# Patient Record
Sex: MALE | Race: WHITE | NOT HISPANIC OR LATINO | Employment: FULL TIME | URBAN - METROPOLITAN AREA
[De-identification: names, ages, dates, MRNs, and addresses within clinical notes are randomized per-mention and may not be internally consistent; named-entity substitution may affect disease eponyms.]

---

## 2023-05-30 ENCOUNTER — TELEPHONE (OUTPATIENT)
Dept: UROLOGY | Facility: CLINIC | Age: 23
End: 2023-05-30
Payer: COMMERCIAL

## 2023-05-30 NOTE — TELEPHONE ENCOUNTER
Health Call Center    Phone Message    May a detailed message be left on voicemail: yes     Reason for Call: The patient called to schedule Orchiectomy consult with Dr. Valdez specifically. Please contact patient. Thank you.    Action Taken: Message routed to:  [facility] ([facility] COMPREHENSIVE GENDER CARE [facility]    Travel Screening: Not Applicable

## 2023-06-01 NOTE — CONFIDENTIAL NOTE
Writer called to discuss pt request for orchiectomy consult. Writer discussed LOS requirements. Pt stated she has a therapist and psychiatrist who could write the letters. Pt to call back and schedule once her providers have written letters.

## 2023-06-08 ENCOUNTER — HOSPITAL ENCOUNTER (EMERGENCY)
Facility: CLINIC | Age: 23
Discharge: HOME | End: 2023-06-08
Attending: EMERGENCY MEDICINE | Admitting: EMERGENCY MEDICINE
Payer: COMMERCIAL

## 2023-06-08 VITALS
SYSTOLIC BLOOD PRESSURE: 117 MMHG | RESPIRATION RATE: 16 BRPM | BODY MASS INDEX: 30.1 KG/M2 | OXYGEN SATURATION: 100 % | DIASTOLIC BLOOD PRESSURE: 68 MMHG | TEMPERATURE: 98.2 F | HEART RATE: 62 BPM | WEIGHT: 215 LBS | HEIGHT: 71 IN

## 2023-06-08 DIAGNOSIS — Z78.9 TRANSGENDER: ICD-10-CM

## 2023-06-08 DIAGNOSIS — F33.1 MODERATE EPISODE OF RECURRENT MAJOR DEPRESSIVE DISORDER (H): ICD-10-CM

## 2023-06-08 DIAGNOSIS — F60.3 BORDERLINE PERSONALITY DISORDER (H): ICD-10-CM

## 2023-06-08 DIAGNOSIS — B37.0 THRUSH: ICD-10-CM

## 2023-06-08 DIAGNOSIS — Z86.59 HISTORY OF ANOREXIA NERVOSA: ICD-10-CM

## 2023-06-08 LAB
AMPHETAMINES UR QL SCN: NORMAL
BARBITURATES UR QL SCN: NORMAL
BENZODIAZ UR QL SCN: NORMAL
BZE UR QL SCN: NORMAL
CANNABINOIDS UR QL SCN: NORMAL
OPIATES UR QL SCN: NORMAL

## 2023-06-08 PROCEDURE — 90471 IMMUNIZATION ADMIN: CPT | Performed by: EMERGENCY MEDICINE

## 2023-06-08 PROCEDURE — 80307 DRUG TEST PRSMV CHEM ANLYZR: CPT | Performed by: EMERGENCY MEDICINE

## 2023-06-08 PROCEDURE — 99285 EMERGENCY DEPT VISIT HI MDM: CPT | Mod: 25 | Performed by: EMERGENCY MEDICINE

## 2023-06-08 PROCEDURE — 250N000011 HC RX IP 250 OP 636: Performed by: EMERGENCY MEDICINE

## 2023-06-08 PROCEDURE — 90715 TDAP VACCINE 7 YRS/> IM: CPT | Performed by: EMERGENCY MEDICINE

## 2023-06-08 PROCEDURE — 99285 EMERGENCY DEPT VISIT HI MDM: CPT | Performed by: EMERGENCY MEDICINE

## 2023-06-08 PROCEDURE — 250N000013 HC RX MED GY IP 250 OP 250 PS 637: Performed by: FAMILY MEDICINE

## 2023-06-08 PROCEDURE — 90791 PSYCH DIAGNOSTIC EVALUATION: CPT

## 2023-06-08 RX ORDER — SPIRONOLACTONE 100 MG/1
150 TABLET, FILM COATED ORAL DAILY
COMMUNITY

## 2023-06-08 RX ORDER — NYSTATIN 100000/ML
500000 SUSPENSION, ORAL (FINAL DOSE FORM) ORAL 4 TIMES DAILY
Qty: 280 ML | Refills: 0 | Status: SHIPPED | OUTPATIENT
Start: 2023-06-08 | End: 2023-06-22

## 2023-06-08 RX ADMIN — NICOTINE POLACRILEX 2 MG: 2 GUM, CHEWING BUCCAL at 18:02

## 2023-06-08 RX ADMIN — CLOSTRIDIUM TETANI TOXOID ANTIGEN (FORMALDEHYDE INACTIVATED), CORYNEBACTERIUM DIPHTHERIAE TOXOID ANTIGEN (FORMALDEHYDE INACTIVATED), BORDETELLA PERTUSSIS TOXOID ANTIGEN (GLUTARALDEHYDE INACTIVATED), BORDETELLA PERTUSSIS FILAMENTOUS HEMAGGLUTININ ANTIGEN (FORMALDEHYDE INACTIVATED), BORDETELLA PERTUSSIS PERTACTIN ANTIGEN, AND BORDETELLA PERTUSSIS FIMBRIAE 2/3 ANTIGEN 0.5 ML: 5; 2; 2.5; 5; 3; 5 INJECTION, SUSPENSION INTRAMUSCULAR at 18:01

## 2023-06-08 ASSESSMENT — COLUMBIA-SUICIDE SEVERITY RATING SCALE - C-SSRS
TOTAL  NUMBER OF ABORTED OR SELF INTERRUPTED ATTEMPTS LIFETIME: NO
1. HAVE YOU WISHED YOU WERE DEAD OR WISHED YOU COULD GO TO SLEEP AND NOT WAKE UP?: NO
ATTEMPT LIFETIME: NO
2. HAVE YOU ACTUALLY HAD ANY THOUGHTS OF KILLING YOURSELF?: NO
TOTAL  NUMBER OF INTERRUPTED ATTEMPTS LIFETIME: NO
6. HAVE YOU EVER DONE ANYTHING, STARTED TO DO ANYTHING, OR PREPARED TO DO ANYTHING TO END YOUR LIFE?: NO

## 2023-06-08 ASSESSMENT — ACTIVITIES OF DAILY LIVING (ADL): ADLS_ACUITY_SCORE: 35

## 2023-06-08 NOTE — DISCHARGE INSTRUCTIONS
To help your wounds heal, wash them daily with warm soapy water for 1 week.  They will heal with time and no sutures are needed at this point.   It has been too long since the cut to suture them now.  You will have scarring from them.     Please make an appointment to follow up with Primary Care - [facility] (phone: [phone]) as soon as possible even if entirely better to establish primary care with them.     Nystatin sent to your pharmacy for thrush concerns.       Aftercare Plan    Date: Friday, 6/9/2023  Time: 7:30 am - 8:30 am  Provider: Nahid Rhoades MD, MD  Location: [facility], [address] [place] [address]  Phone: [phone]  Type: Telepsychiatry  Patient Instructions  [facility] is a [facility] psychiatric clinic. All appointments are through our HIPPA compliant platform. You will receive an on-boarding email from [facility] to assist you with this process and instructions to help set up your patient profile. Please follow the instructions from the email. If you have any questions, please call our direct number and we will assist you. Our phone number is: [phone]    Date: Saturday, 6/10/2023  Time: 11:00 am - 12:00 pm  Provider: Jonah Schneider MA  [facility]  Location: [facility], [address] [place] [address], [address], [place] [address]  Phone: [phone]  Type: Teletherapy    You have been scheduled with the [facility] for a Diagnostic Assessment appointment on 6/21/23 date at 11:00am . Please allow up to 90 -120 minutes for your appointment. This is an IN-PERSON appointment.    [address] [address]    *Follow the signs to the Emergency Room and park in the Yellow or Red Ramp.  You can obtain a reduced parking fee of $2 after your appointment.  The office is located next to [facility].  The  office number is [phone].    Please arrive  before you scheduled appointment time, late arrivals are subject to the need to reschedule.   Please bring contact names and phone numbers for Emergency Contacts, therapist, psychiatrist, PO, attorneys, or other relevant contacts that may be needed.    *Face masking is optional.    Please note: Parents must accompany any patient under the age of 18. Any patient under legal guardianship will need to bring court documents.    Child/ Adolescent appointments can last up to 120+ minutes.  Adult appointments can last up to 90+ minutes.    You will receive a phone call 2-4 days prior to your scheduled time to confirm and remind you of the appointment.      You will receive intake forms via [employer] ([email or web address]) to be completed prior to your appointment.  If able, please complete this prior to your appointment to reduce the appt length of time.      If you need to change this appointment for any reason, please call our Behavioral Access Scheduling office at [phone].  Please note, we ask for at least a 24-hour notice.  Any late cancelations will be considered a no-show.          If I am feeling unsafe or I am in a crisis, I will:   Contact my established care providers   Call the National Suicide Prevention Lifeline: 311.412.3707   Go to the nearest emergency room   Call 911     Warning signs that I or other people might notice when a crisis is developing for me:     I am having increasing suicidal thoughts that turn to plans with intent or means  I am having additional urges to self-harm    My emotions are of hopelessness; feeling like there's no way out.  Rage or anger.  Engaging in risky activities without thinking  Withdrawing from family/friends  Dramatic mood swings  Drastic personality changes   Use of alcohol or drugs  Postings on social media  Neglect of personal hygiene or cares      Things I am able to do on my own to cope or help me feel better:    Other things to Try:  Spending quality  time with loved ones  Staying hydrated  Eating balanced meals  Going for a walk every day  Take care of daily responsibilities/needs  Focus on positive self-talk vs negative self-talk     Things that I am able to do with others to cope or help me better:   Other things to Try:  Exercise  Music  Deep breathing  Meditations  Journal  Self-regulate  Self check-in  Ask for help     Things I can use or do for distraction:   Other things to Try:  Reach out to/spend time with family, friends  Shower  Exercise  Chores or do a project  Listen to music  Watch movie/TV  Listening to music  Journaling  Reading a book  Meditating  Call a friend     Changes I can make to support my mental health and wellness:    -I will abstain from all mood altering chemicals not currently prescribed to me   -I will attend scheduled mental health therapy and psychiatric appointments and follow all   recommendations  -I will commit to 30 minutes of self care daily - this can be as simple as taking a shower, going for a   walk, cooking a meal, read, writing, etc  -I will practice square breathing when I begin to feel anxious - in breath through the nose for the count   of 4 and the first line on the square. Out breath through the mouth for the count of 4 for the second line   of the square. Repeat to complete the square. Repeat the square as many times as needed.  - I will use distraction skills of: going for walks, watching TV, spending time outside, calling a friend or   family member  -Use community resources, including hotline numbers, [facility] crisis and support meetings  -Maintain a daily schedule/routine  -Practice deep breathing skills  -Download a meditation ursula and spend 15-20 minutes per day mediating/relaxing. Some apps to   download include: Calm, Headspace and Insight Timer. All 3 of these apps have free version     People in my life that I can ask for help:   Family  Friends      Your [facility] has a mental health crisis team you can  "call 24/7: [facility] Crisis  [phone] (adults)  [phone] (children)          Crisis Lines  Crisis Text Line  Text 788902  You will be connected with a trained live crisis counselor to provide support.    Por elisabet, texto  RADHA a 048517 o texto a 442-AYUDAME en WhatsApp    [employer] (LGBTQ Youth Crisis Line)  6.588.899.6439  text START to 678-268      Community Resources  Fast Tracker  Linking people to mental health and substance use disorder resources  AMCAD.B[employer]W Tek     [place] Mental Health Warm Line  Peer to peer support  Monday thru Saturday, 12 pm to 10 pm  373.038.6755 or 7.848.383.9054  Text \"Support\" to 76206    National [place] on Mental Illness (LENNY)  033.304.7805 or 1.888.LENNY.HELPS      Mental Health Apps  My3  https://Bizdom/    Speedyboy  https://DataGravity.B&W Tek/apps/virtual-hope-box/      Crisis Lines  Crisis Text Line  Text 081051  You will be connected with a trained live crisis counselor to provide support.    Por espanol, texto  RADHA a 104483 o texto a 442-AYUDAME en WhatsApp    National Hope Line  1.800.SUICIDE [5377601]      Community Resources  Fast Tracker  Linking people to mental health and substance use disorder resources  AMCAD.B[employer]     [place] Mental Health Warm Line  Peer to peer support  Monday thru Saturday, 12 pm to 10 pm  024.010.3844 or 8.668.001.7667  Text \"Support\" to 16817    National [place] on Mental Illness (LENNY)  382.425.2966 or 1.888.LENNY.HELPS      Mental Health Apps  My3  https://ZeusControls.B&W Tek/    Speedyboy  https://DataGravity.org/apps/virtual-hope-box/      Additional Information  Today you were seen by a licensed mental health professional through Triage and Transition services, [facility] ([facility])  for a crisis assessment in the Emergency Department at [facility].  It is recommended that you follow up with your established providers (psychiatrist, mental " health therapist, and/or primary care doctor - as relevant) as soon as possible. Coordinators from [facility] will be calling you in the next 24-48 hours to ensure that you have the resources you need.  You can also contact [facility] coordinators directly at [phone]. You may have been scheduled for or offered an appointment with a mental health provider. [facility] maintains an extensive network of licensed behavioral health providers to connect patients with the services they need.  We do not charge providers a fee to participate in our referral network.  We match patients with providers based on a patient's specific needs, insurance coverage, and location.  Our first effort will be to refer you to a provider within your care system, and will utilize providers outside your care system as needed.        [facility] NEAR YOU!!!    [facility]   [address]  [address]  [place] [address]    Phone: [phone]  Medical Records Fax: [phone]

## 2023-06-08 NOTE — ED PROVIDER NOTES
"    [facility] EMERGENCY DEPARTMENT ([facility])    6/08/23      ED PROVIDER NOTE    History     Chief Complaint   Patient presents with     Self Injury     \"Well I siena sliced my leg open with a  knife last night,\" wound to upper right leg, bleeding controlled, feeling extremely unstable with mood swings.     HPI  Riki \"Emmy\" REJI Olivera is a 22 year old transgender female on hormone replacement therapy with past medical history significant for borderline personality disorder and anorexia who presents to the ED for mental health support \"beyond therapy\". They say they moved to [place] this past month and has been off of their meds.  Last night they cut on their leg and cut deep.  They say they have been struggling with mental health for a long time and want to feel better.  They deny si/hi/hallucinations.  They have hx of substance abuse but say they are sober.  Denies prior suicide attempt.      Physical Exam   BP: 122/66  Pulse: 97  Temp: 98.3  F (36.8  C)  Resp: 16  Height: 180.3 cm (5' 11\") (5'11\")  Weight: 97.5 kg (215 lb)  SpO2: 98 %  Physical Exam  Vitals and nursing note reviewed.   Constitutional:       General: She is not in acute distress.     Appearance: She is normal weight. She is not ill-appearing, toxic-appearing or diaphoretic.   HENT:      Head: Normocephalic and atraumatic.      Nose: No congestion or rhinorrhea.   Eyes:      General: No scleral icterus.     Extraocular Movements: Extraocular movements intact.   Cardiovascular:      Rate and Rhythm: Normal rate.   Pulmonary:      Effort: Pulmonary effort is normal.   Musculoskeletal:         General: Signs of injury (lateral cuts on right lateral thigh.  one is through dermis and is gapping. remainder are not gaping.  no wound repair recommended based on age of cuts) present. No swelling, tenderness or deformity.      Cervical back: Normal range of motion.      Right lower leg: No edema.   Skin:     General: Skin is warm and dry.      " Coloration: Skin is not jaundiced.      Findings: No erythema.   Neurological:      General: No focal deficit present.      Mental Status: She is alert and oriented to person, place, and time.   Psychiatric:         Attention and Perception: Attention and perception normal.         Mood and Affect: Mood is depressed. Affect is not tearful.         Speech: Speech normal.         Behavior: Behavior normal. Behavior is cooperative.         Thought Content: Thought content normal.         Cognition and Memory: Cognition and memory normal.         Judgment: Judgment is impulsive. Judgment is not inappropriate.           ED Course, Procedures, & Data      Procedures    Mental Health Risk Assessment      PSS-3    Date and Time Over the past 2 weeks have you felt down, depressed, or hopeless? Over the past 2 weeks have you had thoughts of killing yourself? Have you ever attempted to kill yourself? When did this last happen? User   06/08/23 1616 yes yes no -- SMS      C-SSRS ([place])    Date and Time Q1 Wished to be Dead (Past Month) Q2 Suicidal Thoughts (Past Month) Q3 Suicidal Thought Method Q4 Suicidal Intent without Specific Plan Q5 Suicide Intent with Specific Plan Q6 Suicide Behavior (Lifetime) Within the Past 3 Months? RETIRED: Level of Risk per Screen Screening Not Complete User   06/08/23 1616 yes yes no no no no -- -- -- SMS                       Results for orders placed or performed during the hospital encounter of 06/08/23   Drug abuse screen 1 urine (ED)     Status: Normal   Result Value Ref Range    Amphetamines Urine Screen Negative Screen Negative    Barbituates Urine Screen Negative Screen Negative    Benzodiazepine Urine Screen Negative Screen Negative    Cannabinoids Urine Screen Negative Screen Negative    Cocaine Urine Screen Negative Screen Negative    Opiates Urine Screen Negative Screen Negative   Urine Drugs of Abuse Screen     Status: Normal    Narrative    The following orders were created for  "panel order Urine Drugs of Abuse Screen.  Procedure                               Abnormality         Status                     ---------                               -----------         ------                     Drug abuse screen 1 urin...[[id number]]  Normal              Final result                 Please view results for these tests on the individual orders.     Medications   Tdap (tetanus-diphtheria-acell pertussis) (ADACEL) injection 0.5 mL (0.5 mLs Intramuscular $Given 6/8/23 1301)   nicotine (NICORETTE) gum 2 mg (2 mg Buccal $Given 6/8/23 1802)     Labs Ordered and Resulted from Time of ED Arrival to Time of ED Departure   DRUG ABUSE SCREEN 1 URINE (ED) - Normal       Result Value    Amphetamines Urine Screen Negative      Barbituates Urine Screen Negative      Benzodiazepine Urine Screen Negative      Cannabinoids Urine Screen Negative      Cocaine Urine Screen Negative      Opiates Urine Screen Negative       No orders to display          Critical care was not performed.     Medical Decision Making  The patient's presentation was of high complexity (a chronic illness severe exacerbation, progression, or side effect of treatment).    The patient's evaluation involved:  ordering and/or review of 1 test(s) in this encounter (see separate area of note for details)  discussion of management or test interpretation with another health professional (dec )    The patient's management necessitated high risk (a decision regarding hospitalization).      Assessment & Plan    Riki \"Emmy\" REJI Olivera is a 22 year old transgender female on hormone replacement therapy with past medical history significant for borderline personality disorder and anorexia who presents to the ED for mental health support \"beyond therapy\".   They cut on their leg last night but deny si/hi.  They were seen by myself and the DEC .  Discharge home with therapy, psychiatry, and DA for dbt were all recommended and arranged.  They " are able to engage in safety planning.   Patient feels like they are having thrush issues and asking for treatment.      I have reviewed the nursing notes. I have reviewed the findings, diagnosis, plan and need for follow up with the patient.    New Prescriptions    NYSTATIN (MYCOSTATIN) [id number] UNIT/ML SUSPENSION    Take 5 mLs (500,000 Units) by mouth 4 times daily for 14 days Take 7-14 days (stop when thrush is gone)       Final diagnoses:   Moderate episode of recurrent major depressive disorder (H)   Borderline personality disorder (H)   Transgender   History of anorexia nervosa   Thrush       Yvonne Small MD  [facility] EMERGENCY DEPARTMENT  6/8/2023     Yvonne Small MD  06/08/23 1820       Yvonne Small MD  06/08/23 1845

## 2023-06-08 NOTE — ED TRIAGE NOTES
"\"Well I siena sliced my leg open with a  knife last night,\" wound to upper right leg, bleeding controlled, feeling extremely unstable with mood swings.     Triage Assessment     Row Name 06/08/23 2512       Triage Assessment (Adult)    Airway WDL WDL       Respiratory WDL    Respiratory WDL WDL       Skin Circulation/Temperature WDL    Skin Circulation/Temperature WDL WDL       Cardiac WDL    Cardiac WDL WDL       Peripheral/Neurovascular WDL    Peripheral Neurovascular WDL WDL       Cognitive/Neuro/Behavioral WDL    Cognitive/Neuro/Behavioral WDL WDL              "

## 2023-06-08 NOTE — CONSULTS
"Diagnostic Evaluation Consultation  Crisis Assessment    Patient was assessed: I-Pad  Patient location: [facility] ED  Was a release of information signed: Yes. Providers included on the release: [facility]       Referral Data and Chief Complaint  Riki \"Emmy\" REJI Olivera  is a 22 year old, who uses she/her pronouns, and presents to the ED alone. Patient is referred to the ED by self. Patient is presenting to the ED for the following concerns: worsening MH.      Informed Consent and Assessment Methods     Patient is her own guardian. Writer met with patient and explained the crisis assessment process, including applicable information disclosures and limits to confidentiality, assessed understanding of the process, and obtained consent to proceed with the assessment. Patient was observed to be able to participate in the assessment as evidenced by Verbal consent. Assessment methods included conducting a formal interview with patient, review of medical records, collaboration with medical staff, and obtaining relevant collateral information from family and community providers when available..     Over the course of this crisis assessment provided reassurance, offered validation, engaged patient in problem solving and disposition planning and worked with patient on safety and aftercare planning. Patient's response to interventions was Positive     Summary of Patient Situation  Patient presents to the emergency department after self-harming last night.  Patient reports that she recently moved from [place] to [place] and her mental health has been in a downward spiral for the last few months but has increased in intensity over the last week.  Patient reports that she is out of her mental medication and has been unable to get a provider in [place].  Patient states she has some pretty significant self-destructive behavior patterns and has  been self-harming for over 10 years.  Patient reports that she has a history of " "borderline personality disorder and has been told the prognosis is good with a right treatment.  Patient came to the ED in the hopes of getting admitted and connected to some sort of resources as she is hopeful that her life will get better.  Patient denies any desire to \"be dead\", she just wants to feel better.       Brief Psychosocial History  Patient reports that she shares a 1 bedroom apartment with a friend.  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  Patient reports she has several friends that she feels supported by.  She does indicate a family history of mental health concerns.  She denies any Zoroastrianism affiliation, any mental concerns.      Significant Clinical History  Patient reports a long history of struggling with self-harm thoughts and ongoing mental health issues.  Patient reports that she has not been \"mentally happy or stable\" \"ever\".  She was seeing a therapist in [place] and she really enjoyed and felt close to her therapist but had to stop seeing her when she moved to [place].  Patient does not currently have any mental health providers.  She reports a history of depression, borderline personality disorder and anorexia.  Patient reports that her anorexia is in remission but she can tell that she is having in a direction where her anorexia will get worse without treatment.  Patient would like support getting connected to DBT therapy.  Patient denies any history of SI.     Collateral Information   called patient's roommate Tyshawn ([phone]) he reports that he is aware of patient's struggles with mental health.  He will be home all evening tonight and reports he has been supporting and helping patient through some of her mental health.     Risk Assessment  [place] Suicide Severity Rating Scale Full Clinical Version:6/8/23  Suicidal Ideation  1. Wish to be Dead (Lifetime): No  2. Non-Specific Active Suicidal Thoughts (Lifetime): No   "   Suicidal Behavior  Actual Attempt (Lifetime): No  Has subject engaged in non-suicidal self-injurious behavior? (Lifetime): Yes  Has subject engaged in non-suicidal self-injurious behavior? (Past 3 Months): Yes  Interrupted Attempts (Lifetime): No  Aborted or Self-Interrupted Attempt (Lifetime): No  Preparatory Acts or Behavior (Lifetime): No  C-SSRS Risk (Lifetime/Recent)  Calculated C-SSRS Risk Score (Lifetime/Recent): No Risk Indicated    Mitchells Suicide Severity Rating Scale Since Last Contact:      Validity of evaluation is not impacted by presenting factors during interview    Comments regarding subjective versus objective responses to Mitchells tool: Patient's responses appeared genuine.  Environmental or Psychosocial Events: geographic isolation from supports, barriers to accessing healthcare and impulsivity/recklessness  Chronic Risk Factors: LGBTQ+ orientation    Warning Signs: seeking access to means to hurt or kill self, talking or writing about death, dying, or suicide, hopelessness, pain (new or worsening), acting reckless or engaging in risky activities, increasing substance use or abuse, withdrawing from friends, family, and society and anxiety, agitation, unable to sleep, sleeping all the time  Protective Factors: lives in a responsibly safe and stable environment, sense of importance of health and wellness, supportive ongoing medical and mental health care relationships and help seeking  Interpretation of Risk Scoring, Risk Mitigation Interventions and Safety Plan:  Patient denies any current or history of suicidal ideation.  Patient is future oriented and is currently in the emergency room seeking help.       Does the patient have thoughts of harming others? No     Is the patient engaging in sexually inappropriate behavior?  no        Current Substance Abuse     Is there recent substance abuse? 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 Used ACID once which did not go well.       Was a urine drug screen or blood alcohol level obtained: No       Mental Status Exam     Affect: Appropriate   Appearance: Appropriate    Attention Span/Concentration: Attentive  Eye Contact: Engaged   Fund of Knowledge: Appropriate    Language /Speech Content: Fluent   Language /Speech Volume: Normal    Language /Speech Rate/Productions: Normal    Recent Memory: Intact   Remote Memory: Intact   Mood: Anxious and Depressed    Orientation to Person: Yes    Orientation to Place: Yes   Orientation to Time of Day: Yes    Orientation to Date: Yes    Situation (Do they understand why they are here?): Yes    Psychomotor Behavior: Normal    Thought Content: Clear   Thought Form: Intact      History of commitment: No      Medication    Psychotropic medications: No  Medication changes made in the last two weeks: Yes: Stopped taking medication        Current Care Team    Primary Care Provider: No  Psychiatrist: No  Therapist: No  : No     CTSS or [facility]: No  ACT Team: No  Other: No      Diagnosis    Major depressive disorder, Recurrent episode, Moderate - (F33.1)  Borderline personality disorder - (F60.3)    Clinical Summary and Substantiation of Recommendations     Patient arrived in the emergency department to get connected to services to better support her mental health.  Patient is not currently receiving any mental health care since moving from [place].   discussed different options with patient.   set patient up with a psychiatry appointment for tomorrow, therapy on Saturday.  Patient was also set up with a diagnostic evaluation to get into a  MH Program.   also completed a release of information for [facility] DBT therapy and encouraged patient to reach out once this assessment is faxed.  Patient is future oriented, help seeking, and is requesting help getting connected resources.  Patient also adamantly denies any history of suicide attempts or any current SI. Patient is safe to discharge.    also spoke with patient's roommate who will continue to help support and watch for patient.        Disposition    Recommended disposition: Individual Therapy, Medication Management and Programmatic Care: DBT       Reviewed case and recommendations with attending provider. Attending Name: Dr. Small       Attending concurs with disposition: Yes       Patient and/or validated legal guardian concurs with disposition: Yes       Final disposition: Individual therapy  and Medication management.       Outpatient Details (if applicable):   Aftercare plan and appointments placed in the AVS and provided to patient: Yes. Given to patient by Bedside RN    Was lethal means counseling provided as a part of aftercare planning? No;       Assessment Details    Patient interview started at: 5:10 PM and completed at: 6:10 PM.     Total duration spent on the patient case in minutes: 1.0 hrs      CPT code(s) utilized: 51328 - Psychotherapy for Crisis - 60 (30-74*) min         Angelica Branch, VANDANA, LICSW, LM  DEC - Triage & Transition Services  Callback: [phone]

## 2023-06-16 ENCOUNTER — TELEPHONE (OUTPATIENT)
Dept: BEHAVIORAL HEALTH | Facility: CLINIC | Age: 23
End: 2023-06-16
Payer: COMMERCIAL

## 2023-06-21 ENCOUNTER — HOSPITAL ENCOUNTER (OUTPATIENT)
Dept: BEHAVIORAL HEALTH | Facility: CLINIC | Age: 23
Discharge: HOME | End: 2023-06-21
Attending: FAMILY MEDICINE
Payer: COMMERCIAL

## 2023-06-21 DIAGNOSIS — F32.9 MDD (MAJOR DEPRESSIVE DISORDER): Primary | ICD-10-CM

## 2023-06-21 PROCEDURE — 999N000216 HC STATISTIC ADULT CD FACE TO FACE-NO CHRG: Performed by: COUNSELOR

## 2023-06-21 ASSESSMENT — ANXIETY QUESTIONNAIRES
5. BEING SO RESTLESS THAT IT IS HARD TO SIT STILL: NEARLY EVERY DAY
4. TROUBLE RELAXING: NEARLY EVERY DAY
1. FEELING NERVOUS, ANXIOUS, OR ON EDGE: NEARLY EVERY DAY
2. NOT BEING ABLE TO STOP OR CONTROL WORRYING: NEARLY EVERY DAY
GAD7 TOTAL SCORE: 20
GAD7 TOTAL SCORE: 20
3. WORRYING TOO MUCH ABOUT DIFFERENT THINGS: NEARLY EVERY DAY
7. FEELING AFRAID AS IF SOMETHING AWFUL MIGHT HAPPEN: MORE THAN HALF THE DAYS
6. BECOMING EASILY ANNOYED OR IRRITABLE: NEARLY EVERY DAY

## 2023-06-21 ASSESSMENT — PATIENT HEALTH QUESTIONNAIRE - PHQ9: SUM OF ALL RESPONSES TO PHQ QUESTIONS 1-9: 16
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
## 2023-06-21 NOTE — ADDENDUM NOTE
Encounter addended by: Renato Arguello [facility] on: 6/21/2023 3:18 PM   Actions taken: Flowsheet accepted Quality 130: Documentation Of Current Medications In The Medical Record: Current Medications Documented Quality 131: Pain Assessment And Follow-Up: Pain assessment using a standardized tool is documented as negative, no follow-up plan required Detail Level: Simple Quality 226: Preventive Care And Screening: Tobacco Use: Screening And Cessation Intervention: Patient screened for tobacco use and is an ex/non-smoker Quality 110: Preventive Care And Screening: Influenza Immunization: Influenza Immunization Administered during Influenza season

## 2023-06-21 NOTE — PATIENT INSTRUCTIONS
[facility]  Time: 2:15pm  Date: July 12th,2023  Address: [address] [place] [address] [address] [place] [address]             Phone: [phone]    MY COPING PLAN FOR SAFETY        Name: Riki Olivera  YOB: 2000  Date: June 21, 2023   My primary care provider: Jocelyne Barroso  My primary care clinic: [facility]  My prescriber: Provider: Nahid Rhoades MD, MD  Location: [facility], [address], [place]  [address]  Phone: [phone]  Other care team support:  Therapist: Provider: Jonah Schneider MA  [facility]  Location: [facility], [address],  [place] [address]  Phone: [phone]   My Triggers:  Thinking about how I can't have kids   Additional People, Places, and Things that I can access for support: Roommate and bestfriend         What is important to me and makes life worth living: having kids some day .        GREEN    Good Control  1. I feel good  2. No suicidal thoughts   3. Can work, sleep and play      Action Steps  1. Self-care: balanced meals, exercising, sleep practices, etc.  2. Take your medications as prescribed.  3. Continue meetings with therapist and prescriber.  4.  Do the healthy things that I enjoy.            YELLOW  Getting Worse  I have ANY of these:  1. I do not feel good  2. Difficulty Concentrating  3. Sleep is changing  4. Increase/Change in my thoughts to hurt self and/or others, but I can still manage and not act on it.   5. Not taking care of self.               Action Steps (in addition to the above):  1. Inform your therapist and psychiatric prescriber/PCP.  2. Keep taking your medications as prescribed.    3. Turn to people you can ask for help.  4. Use internal coping strategies -see below.  5. Create safe environment: lock and limit medications, notify friends/family of increase in symptoms and reduce means to other identified method            RED  Get Help  If I have ANY of  these:  1. Current and uncontrollable thoughts and/or behaviors to hurt self and/or others.      Actions to manage my safety  1. Contact your emergency person Tyshawn Mosquera (friend) [phone]  2. Call my crisis team- [facility] [phone] [facility]  3. Or Call 911 or go to the emergency room right away          My Internal Coping Strategies include the following:  take a bath, belly breathing, arts and crafts, color, chew gum and fidget toys    [Brief Safety Plan provided via email]     Safety Concerns  How To Identify Situations That Make Your Mental Health Worse:  Triggers are things that make your mental health worse.  Look at the list below to help you find your triggers and what you can do about them.     1. Identify Early Warning Signs:    Sometimes symptoms return, even when people do their best to stay well. Symptoms can develop over a short period of time with little or no warning, but most of the time they emerge gradually over several weeks.  Early warning signs are changes that people experience when a relapse is starting. Some early warning signs are common and others are not as common.     Common Early Warning Signs:    Feeling tense or nervous, Eating less or eating more, Trouble sleeping -either too much or too little sleep, Feeling depressed or low, Feeling irritable, Feeling like not being around other people, Trouble concentrating and Urges to harm self     2. Identify action steps to take when warning signs are noticed:    Taking Action- It is important to take action if you are experiencing early warning signs of a relapse.  The faster you act, the more likely it is that you can avoid a full relapse.  It is helpful to identify several specific ways to cope with symptoms.      The following is my list of symptoms and coping strategies that I can use when they are present:    Symptom Coping Strategies   Anxiety -Talk with someone in your support system and let him  or her know how you are feeling.  -Use relaxation techniques such as deep breathing or imagery.  -Use positive affirmations to counteract negative self-talk such as  I am learning to let go of worry.    Depression - Schedule your day; include activities you have to do and activities you enjoy doing.  - Get some exercise - walk, run, bike, or swim.  - Give yourself credit for even the smallest things you get done.   Sleep Difficulties   - Go to sleep at the same time every day.  - Do something relaxing before bed, such as drinking herbal tea or listening to music.  - Avoid having discussions about upsetting topics before going to bed.   Delusions   - Distract yourself from the disturbing thought by doing something that requires your attention such as a puzzle.  - Check out your beliefs by talking to someone you trust.    Hallucinations   - Use headphones to listen to music.  - Tell voices to  stop  or say to yourself,  I am safe.   - Ignore the hallucinations as much as possible; focus on other things.   Concentration Difficulties - Minimize distractions so there is only one thing for you to focus on at a time.    - Ask the person you are having a conversation with to slow down or repeat things you are unsure of.

## 2023-07-24 NOTE — CONFIDENTIAL NOTE
Writer called to answer pt questions about LOS. Writer confirmed that letters should be addressed to Dr. Hussain West, who will do the surgery. Pt to call back to schedule consult for orchiectomy once letters are ready.

## 2023-07-31 ENCOUNTER — OFFICE VISIT (OUTPATIENT)
Dept: URGENT CARE | Facility: URGENT CARE | Age: 23
End: 2023-07-31
Payer: COMMERCIAL

## 2023-07-31 VITALS
WEIGHT: 215 LBS | OXYGEN SATURATION: 98 % | BODY MASS INDEX: 30.1 KG/M2 | RESPIRATION RATE: 16 BRPM | SYSTOLIC BLOOD PRESSURE: 124 MMHG | TEMPERATURE: 97.3 F | HEIGHT: 71 IN | DIASTOLIC BLOOD PRESSURE: 80 MMHG | HEART RATE: 78 BPM

## 2023-07-31 DIAGNOSIS — R45.82 WORRIES: Primary | ICD-10-CM

## 2023-07-31 PROCEDURE — 99202 OFFICE O/P NEW SF 15 MIN: CPT | Performed by: PHYSICIAN ASSISTANT

## 2023-07-31 RX ORDER — LAMOTRIGINE 25 MG/1
50 TABLET ORAL DAILY
COMMUNITY

## 2023-07-31 RX ORDER — HYDROXYZINE HYDROCHLORIDE 25 MG/1
TABLET, FILM COATED ORAL
COMMUNITY
Start: 2023-06-10

## 2023-07-31 ASSESSMENT — ENCOUNTER SYMPTOMS
CHILLS: 0
COUGH: 1
NAUSEA: 1
SORE THROAT: 0
HEADACHES: 1
FATIGUE: 1
FEVER: 0

## 2023-07-31 NOTE — PROGRESS NOTES
"SUBJECTIVE:   Riki Olivera is a 22 year old male presenting with a chief complaint of   Chief Complaint   Patient presents with    Urgent Care    Mouth Problem     Concern of infection in mouth x 3 months, getting worse.        She is an established patient of [facility].    Mouth sores in inside corners of mouth. Thought they may have thrush due to white on tongue and bottom left corner of mouth that is found each morning. Hydrogen peroxide - has been using daily, feels it helps some. Has also tried Nystatin prescription mouth wash without relief, not currently using it. No new medications.     Review of Systems   Constitutional:  Positive for fatigue. Negative for chills and fever.   HENT:  Negative for sore throat.    Respiratory:  Positive for cough.    Gastrointestinal:  Positive for nausea.   Neurological:  Positive for headaches.       Past Medical History:   Diagnosis Date    Anorexia     Borderline personality disorder (H)      No family history on file.  Current Outpatient Medications   Medication Sig Dispense Refill    ESTRADIOL VALERATE IM Inject into the muscle. 200 mg/5 mL injection 0.6 ml once a week      FLUoxetine (PROZAC) 20 MG capsule Take 20 mg by mouth daily      hydrOXYzine (ATARAX) 25 MG tablet TAKE 1 TABLET BY MOUTH TWICE A DAY AS NEEDED FOR ANXIETY OR SLEEP      lamoTRIgine (LAMICTAL) 25 MG tablet Take 50 mg by mouth daily      spironolactone (ALDACTONE) 100 MG tablet Take 150 mg by mouth daily       Social History     Tobacco Use    Smoking status: Not on file    Smokeless tobacco: Not on file   Substance Use Topics    Alcohol use: Not on file       OBJECTIVE  /80   Pulse 78   Temp 97.3  F (36.3  C) (Temporal)   Resp 16   Ht 1.803 m (5' 11\")   Wt 97.5 kg (215 lb)   SpO2 98%   BMI 29.99 kg/m      Physical Exam  Vitals reviewed.   Constitutional:       General: She is not in acute distress.     Appearance: Normal appearance. She is not ill-appearing, toxic-appearing or " diaphoretic.   HENT:      Right Ear: Tympanic membrane, ear canal and external ear normal. There is no impacted cerumen.      Left Ear: Tympanic membrane, ear canal and external ear normal. There is no impacted cerumen.      Mouth/Throat:      Mouth: Mucous membranes are moist.      Pharynx: Oropharynx is clear. No oropharyngeal exudate or posterior oropharyngeal erythema.      Comments: No white coating, no mouth ulcers or lesions noted.   Eyes:      General: No scleral icterus.        Right eye: No discharge.         Left eye: No discharge.      Conjunctiva/sclera: Conjunctivae normal.   Cardiovascular:      Rate and Rhythm: Normal rate and regular rhythm.      Heart sounds: No murmur heard.     No friction rub. No gallop.   Pulmonary:      Effort: Pulmonary effort is normal. No respiratory distress.      Breath sounds: Normal breath sounds. No stridor. No wheezing, rhonchi or rales.   Skin:     General: Skin is warm and dry.   Neurological:      Mental Status: She is alert.   Psychiatric:         Mood and Affect: Mood normal.         Behavior: Behavior normal.         Labs:  No results found for this or any previous visit (from the past 24 hour(s)).    X-Ray was not done.    ASSESSMENT:  No diagnosis found.     Medical Decision Making:  No abnormalities noted on exam.     Serious Comorbid Conditions:  Adult:  None    PLAN:  - Recommended discontinuation of hydrogen peroxide, and starting a Listerine mouthwash   - Discussed it is normal to have some form of a coating in your mouth in the morning before brushing your teeth, recommended follow up with dentist if this continues to be a concern.    There are no Patient Instructions on file for this visit.       Left/Brachial Vein/Left/Brachial

## 2023-09-11 ENCOUNTER — TELEPHONE (OUTPATIENT)
Dept: PLASTIC SURGERY | Facility: CLINIC | Age: 23
End: 2023-09-11
Payer: COMMERCIAL

## 2023-09-11 NOTE — TELEPHONE ENCOUNTER
[facility] :  Care Coordination Note     SITUATION   Emmy Olivera (she/her) is a 22 year old adult who is receiving support for:  Care Team  .    BACKGROUND     Pt is scheduled for an orchiectomy consult with Dr. West on 10/4/23.     ASSESSMENT     Surgery              CGC Assessment  [facility] ([facility]GC) Enrollment: (P) Enrolled  Patient has a therapist: (P) Yes  Letter of support #1: (P) Requested  Letter of support #2: (P) Requested  Surgery being considered: (P) Yes  Orchiectomy: (P) Yes    Pt reports:   No nicotine or other gender affirming surgeries  HRT 2 years  PLAN          Nursing Interventions:  @FLOW([id number]::1:2)@    Follow-up plan:  1. Upload LOS or bring to appointment.        Calista Cooper

## 2023-09-14 ENCOUNTER — PRE VISIT (OUTPATIENT)
Dept: UROLOGY | Facility: CLINIC | Age: 23
End: 2023-09-14
Payer: COMMERCIAL

## 2023-09-14 NOTE — TELEPHONE ENCOUNTER
Reason for visit: consult for      Dx/Hx/Sx: orchiectomy    Records/imaging/labs/orders: in epic    At Rooming: standard

## 2023-10-04 ENCOUNTER — OFFICE VISIT (OUTPATIENT)
Dept: UROLOGY | Facility: CLINIC | Age: 23
End: 2023-10-04
Payer: COMMERCIAL

## 2023-10-04 VITALS
BODY MASS INDEX: 31.5 KG/M2 | WEIGHT: 225 LBS | RESPIRATION RATE: 18 BRPM | HEIGHT: 71 IN | SYSTOLIC BLOOD PRESSURE: 134 MMHG | DIASTOLIC BLOOD PRESSURE: 77 MMHG | OXYGEN SATURATION: 98 % | HEART RATE: 86 BPM

## 2023-10-04 DIAGNOSIS — F64.0 GENDER DYSPHORIA IN ADULT: Primary | ICD-10-CM

## 2023-10-04 PROCEDURE — 99204 OFFICE O/P NEW MOD 45 MIN: CPT | Mod: KX | Performed by: UROLOGY

## 2023-10-04 RX ORDER — FLUOXETINE 40 MG/1
40 CAPSULE ORAL DAILY
COMMUNITY

## 2023-10-04 ASSESSMENT — PAIN SCALES - GENERAL: PAINLEVEL: NO PAIN (0)

## 2023-10-04 NOTE — NURSING NOTE
"Chief Complaint   Patient presents with    Consult     Consult for surgery.        Blood pressure 134/77, pulse 86, resp. rate 18, height 1.803 m (5' 11\"), weight 102.1 kg (225 lb), SpO2 98 %. Body mass index is 31.38 kg/m .    There is no problem list on file for this patient.      Allergies   Allergen Reactions    Codeine Anaphylaxis       Current Outpatient Medications   Medication Sig Dispense Refill    FLUoxetine (PROZAC) 40 MG capsule Take 40 mg by mouth daily      ESTRADIOL VALERATE IM Inject into the muscle. 200 mg/5 mL injection 0.6 ml once a week      FLUoxetine (PROZAC) 20 MG capsule Take 20 mg by mouth daily (Patient not taking: Reported on 10/4/2023)      hydrOXYzine (ATARAX) 25 MG tablet TAKE 1 TABLET BY MOUTH TWICE A DAY AS NEEDED FOR ANXIETY OR SLEEP      lamoTRIgine (LAMICTAL) 25 MG tablet Take 50 mg by mouth daily      spironolactone (ALDACTONE) 100 MG tablet Take 150 mg by mouth daily         Social History     Tobacco Use    Smoking status: Every Day     Types: Cigarettes, Vaping Device    Tobacco comments:     11 years of vaping and some tobacco        Renecia Colon  10/4/2023  10:09 AM     "

## 2023-10-04 NOTE — PROGRESS NOTES
"    Name: Riki Olivera \"Emmy\"    MRN: [id number]   YOB: 2000                 Chief Complaint:   Gender Dysphoria            History of Present Illness:   Emmy is a 22 year old transgender female seen in consultation for gender dysphoria    Patient states she has always been who she is and does not identify as transgender. She said she deeply repressed her true identity until late teens when she started accepting she is a woman. She has transitioned gradually as feminine qualities more embraced. HRT in 2021.  Preferred pronouns are: she/her/hers  The patient has been on exogenous hormones since: May 14, 2021.  In terms of an intimate relationship, the patient lives with her boyfriend who is very supportive  In terms of fertility, the patient: has no desire for biologic children or sperm banking    (New)  The patient has obtained letters of support from two providers. She will submit then through [employer] for review    (Prior Surgery)  The patient has previously undergone no gender surgeries.    Long-term surgical goals for the patient include: full depth vaginoplasty in the future. She states she is not in a place to take the required time off to heal from full vaginoplasty procedure at this time. She wants to have the orchiectomy to remove testicles which cause dysphoria. She is eager to get off spironolactone due to side effects.    The patient is here today expressing interest in orchiectomy.    The patient has not begun hair removal.          Past Medical History:     Past Medical History:   Diagnosis Date     Anorexia      Borderline personality disorder (H)    Gender dysphoria         Past Surgical History:   No past surgical history on file.  No previous surgery         Social History:     Social History     Tobacco Use     Smoking status: Every Day     Types: Cigarettes, Vaping Device     Smokeless tobacco: Not on file     Tobacco comments:     11 years of vaping and some tobacco  " "  Substance Use Topics     Alcohol use: Not on file            Family History:   No family history on file.           Allergies:     Allergies   Allergen Reactions     Codeine Anaphylaxis            Medications:     Current Outpatient Medications   Medication Sig     FLUoxetine (PROZAC) 40 MG capsule Take 40 mg by mouth daily     ESTRADIOL VALERATE IM Inject into the muscle. 200 mg/5 mL injection 0.6 ml once a week     FLUoxetine (PROZAC) 20 MG capsule Take 20 mg by mouth daily (Patient not taking: Reported on 10/4/2023)     hydrOXYzine (ATARAX) 25 MG tablet TAKE 1 TABLET BY MOUTH TWICE A DAY AS NEEDED FOR ANXIETY OR SLEEP     lamoTRIgine (LAMICTAL) 25 MG tablet Take 50 mg by mouth daily     spironolactone (ALDACTONE) 100 MG tablet Take 150 mg by mouth daily     No current facility-administered medications for this visit.             Review of Systems:    ROS: ROS neg other than the symptoms noted above in the HPI.          Physical Exam:   /77 (BP Location: Right arm, Patient Position: Sitting, Cuff Size: Adult Small)   Pulse 86   Resp 18   Ht 1.803 m (5' 11\")   Wt 102.1 kg (225 lb)   SpO2 98%   BMI 31.38 kg/m    General: age-appropriate in NAD  HEENT: Head AT/NC, EOMI, CN Grossly intact  Resp: no respiratory distress  : phallus wnl. Testicles present. Scrotum WNL  Neuro: grossly intact  Motor: excellent strength throughout  Skin: clear of rashes or ecchymoses.             Assessment and Plan:   22 year old transgender female with gender dysphoria    The criteria for genital surgery are specific to the type of surgery being requested.  Criteria for bottom surgery:    1. Persistent, well documented gender dysphoria;  2. Capacity to make a fully informed decision and to consent for treatment;  3. Age of consent (>18 years old)  4. If significant medical or mental health concerns are present, they must be well controlled.  5. 12 continuous months of hormone therapy as appropriate to the patient s gender " goals (unless  the patient has a medical contraindication or is otherwise unable or unwilling to take  Hormones).  6. Two letters of support    The aim of hormone therapy prior to gonadectomy is primarily to introduce a period of reversible  estrogen or testosterone suppression, before the patient undergoes irreversible surgical intervention.    I reviewed the steps of orchiectomy. I reviewed the surgical procedure. I reviewed the risks and benefits including bleeding, infection and irreversible nature of the procedure. The patient would like to proceed with orchiectomy as soon as possible. She plans a future full depth vaginoplasty but is not ready to start hair removal or other steps due to current finances and inability to take extended time off work.    Hair removal is a requirement prior to full depth vaginoplasty as the genital skin will be placed in the vaginal cavity. Lack of hair removal would lead to complications related to intravaginal hair. This is nearly impossible to remove postoperatively. Patient is aware of this required hair removal and will contact us to schedule a return consult when she is ready to move forward with vaginoplasty process in the future    She has a persistent, well documented gender dysphoria. She has capacity to make a fully informed decision and to consent for treatment. Her mental health issues are well controlled. She has been on continuous hormones for years. She two letters of support.     The patient meets all of these criteria. We discussed that gender affirmation surgery should be considered permanent.      Patient requested focusing on orchiectomy procedure only and not discussing the vaginoplasty options today. She would like to return for a vaginoplasty consult in the future    Once LOS reviewed she is ready for prior auth    Plan: Patient will submit her two letters of support for review via [employer]. Once reviewed by DELISA she is ready for PA for  orchiectomy      Physician Attestation   I, Hussain West MD, saw this patient and agree with the findings and plan of care as documented in the note.      Hussain West MD  Reconstructive Urology

## 2023-10-04 NOTE — LETTER
"10/4/2023       RE: Riki Olivera  [address] [place] [address]  [address]  [place] [address]     Dear Colleague,    Thank you for referring your patient, Riki Olivera, to the [facility] [place] at [facility]. Please see a copy of my visit note below.        Name: Riki Olivera \"Emmy\"    MRN: [id number]   YOB: 2000                 Chief Complaint:   Gender Dysphoria            History of Present Illness:   Emmy is a 22 year old transgender female seen in consultation for gender dysphoria    Patient states she has always been who she is and does not identify as transgender. She said she deeply repressed her true identity until late teens when she started accepting she is a woman. She has transitioned gradually as feminine qualities more embraced. HRT in 2021.  Preferred pronouns are: she/her/hers  The patient has been on exogenous hormones since: May 14, 2021.  In terms of an intimate relationship, the patient lives with her boyfriend who is very supportive  In terms of fertility, the patient: has no desire for biologic children or sperm banking    (New)  The patient has obtained letters of support from two providers. She will submit then through "[employer] for review    (Prior Surgery)  The patient has previously undergone no gender surgeries.    Long-term surgical goals for the patient include: full depth vaginoplasty in the future. She states she is not in a place to take the required time off to heal from full vaginoplasty procedure at this time. She wants to have the orchiectomy to remove testicles which cause dysphoria. She is eager to get off spironolactone due to side effects.    The patient is here today expressing interest in orchiectomy.    The patient has not begun hair removal.          Past Medical History:     Past Medical History:   Diagnosis Date    Anorexia     Borderline personality disorder (H)    Gender " "dysphoria         Past Surgical History:   No past surgical history on file.  No previous surgery         Social History:     Social History     Tobacco Use    Smoking status: Every Day     Types: Cigarettes, Vaping Device    Smokeless tobacco: Not on file    Tobacco comments:     11 years of vaping and some tobacco    Substance Use Topics    Alcohol use: Not on file            Family History:   No family history on file.           Allergies:     Allergies   Allergen Reactions    Codeine Anaphylaxis            Medications:     Current Outpatient Medications   Medication Sig    FLUoxetine (PROZAC) 40 MG capsule Take 40 mg by mouth daily    ESTRADIOL VALERATE IM Inject into the muscle. 200 mg/5 mL injection 0.6 ml once a week    FLUoxetine (PROZAC) 20 MG capsule Take 20 mg by mouth daily (Patient not taking: Reported on 10/4/2023)    hydrOXYzine (ATARAX) 25 MG tablet TAKE 1 TABLET BY MOUTH TWICE A DAY AS NEEDED FOR ANXIETY OR SLEEP    lamoTRIgine (LAMICTAL) 25 MG tablet Take 50 mg by mouth daily    spironolactone (ALDACTONE) 100 MG tablet Take 150 mg by mouth daily     No current facility-administered medications for this visit.             Review of Systems:    ROS: ROS neg other than the symptoms noted above in the HPI.          Physical Exam:   /77 (BP Location: Right arm, Patient Position: Sitting, Cuff Size: Adult Small)   Pulse 86   Resp 18   Ht 1.803 m (5' 11\")   Wt 102.1 kg (225 lb)   SpO2 98%   BMI 31.38 kg/m    General: age-appropriate in NAD  HEENT: Head AT/NC, EOMI, CN Grossly intact  Resp: no respiratory distress  : phallus wnl. Testicles present. Scrotum WNL  Neuro: grossly intact  Motor: excellent strength throughout  Skin: clear of rashes or ecchymoses.             Assessment and Plan:   22 year old transgender female with gender dysphoria    The criteria for genital surgery are specific to the type of surgery being requested.  Criteria for bottom surgery:    1. Persistent, well " documented gender dysphoria;  2. Capacity to make a fully informed decision and to consent for treatment;  3. Age of consent (>18 years old)  4. If significant medical or mental health concerns are present, they must be well controlled.  5. 12 continuous months of hormone therapy as appropriate to the patient s gender goals (unless  the patient has a medical contraindication or is otherwise unable or unwilling to take  Hormones).  6. Two letters of support    The aim of hormone therapy prior to gonadectomy is primarily to introduce a period of reversible  estrogen or testosterone suppression, before the patient undergoes irreversible surgical intervention.    I reviewed the steps of orchiectomy. I reviewed the surgical procedure. I reviewed the risks and benefits including bleeding, infection and irreversible nature of the procedure. The patient would like to proceed with orchiectomy as soon as possible. She plans a future full depth vaginoplasty but is not ready to start hair removal or other steps due to current finances and inability to take extended time off work.    Hair removal is a requirement prior to full depth vaginoplasty as the genital skin will be placed in the vaginal cavity. Lack of hair removal would lead to complications related to intravaginal hair. This is nearly impossible to remove postoperatively. Patient is aware of this required hair removal and will contact us to schedule a return consult when she is ready to move forward with vaginoplasty process in the future    She has a persistent, well documented gender dysphoria. She has capacity to make a fully informed decision and to consent for treatment. Her mental health issues are well controlled. She has been on continuous hormones for years. She two letters of support.     The patient meets all of these criteria. We discussed that gender affirmation surgery should be considered permanent.      Patient requested focusing on orchiectomy  procedure only and not discussing the vaginoplasty options today. She would like to return for a vaginoplasty consult in the future    Once LOS reviewed she is ready for prior auth    Plan: Patient will submit her two letters of support for review via [employer]. Once reviewed by  she is ready for PA for orchiectomy      Physician Attestation   I, Hussain West MD, saw this patient and agree with the findings and plan of care as documented in the note.      Hussain West MD  Reconstructive Urology

## 2023-10-12 ENCOUNTER — DOCUMENTATION ONLY (OUTPATIENT)
Dept: PLASTIC SURGERY | Facility: CLINIC | Age: 23
End: 2023-10-12
Payer: COMMERCIAL

## 2023-10-12 NOTE — PROGRESS NOTES
[facility] :  Care Coordination Note     SITUATION   Emmy Olivera (she/her) is a 22 year old adult who is receiving support for:  Care Team  .    BACKGROUND     Two LOS received. LOS (dated 9/9/23) meets criteria. LOS  (dated 08/28/23) will need updates. Sent message and email to pt with needed updates.    ASSESSMENT     Surgery              CGC Assessment  Comprehensive Gender Care (CGC) Enrollment: Enrolled  Patient has a therapist: Yes  Name of therapist: Brett Stewart  Letter of support #1: Received  Letter #1 Date: 09/08/23  Letter of support #2: Received  Surgery being considered: Yes  Orchiectomy: Yes      PLAN          Nursing Interventions:       Follow-up plan:  Pt will upload updated LOS.       ROYCE Rivers

## 2023-10-15 ENCOUNTER — HEALTH MAINTENANCE LETTER (OUTPATIENT)
Age: 23
End: 2023-10-15

## 2023-12-13 ENCOUNTER — VIRTUAL VISIT (OUTPATIENT)
Dept: UROLOGY | Facility: CLINIC | Age: 23
End: 2023-12-13
Payer: COMMERCIAL

## 2023-12-13 DIAGNOSIS — F64.9 GENDER DYSPHORIA: Primary | ICD-10-CM

## 2023-12-13 PROCEDURE — 99214 OFFICE O/P EST MOD 30 MIN: CPT | Mod: 95 | Performed by: UROLOGY

## 2023-12-13 RX ORDER — TRAZODONE HYDROCHLORIDE 50 MG/1
TABLET, FILM COATED ORAL
COMMUNITY
Start: 2023-12-04

## 2023-12-13 ASSESSMENT — PAIN SCALES - GENERAL: PAINLEVEL: NO PAIN (0)

## 2023-12-13 NOTE — LETTER
12/13/2023       RE: Riki Olivera  [facility]  [address]  [place] [address]     Dear Colleague,    Thank you for referring your patient, Riki Olivera, to the [facility]OLOGY [facility]IN[facility] [place] at [facility]. Please see a copy of my visit note below.    Virtual Visit Details    Type of service:  Video Visit     Originating Location (pt. Location): Home    Distant Location (provider location):  Off-site  Platform used for Video Visit: Mal   200p-215p      Emmy is a 22 year old transgender female seen in consultation for gender dysphoria     Patient states she has always been who she is and does not identify as transgender. She said she deeply repressed her true identity until late teens when she started accepting she is a woman. She has transitioned gradually as feminine qualities more embraced. HRT in 2021.  Preferred pronouns are: she/her/hers  The patient has been on exogenous hormones since: May 14, 2021.  In terms of an intimate relationship, the patient lives with her boyfriend who is very supportive  In terms of fertility, the patient: has no desire for biologic children or sperm banking    Finalizing letters of support    The patient has previously undergone no gender surgeries.     Long-term surgical goals for the patient include: minimal depth vaginoplasty     Did consider orchiectomy at first. But just wants minimal depth.     The patient has not begun hair removal.        Exam:           Constitutional - No apparent distress. Patient of stated age.               Eyes - no redness or discharge.              Respiratory - no cough. no labored breathing              Musculoskeletal - full range of motion in all extremities              Skin - no visible skin discoloration or lesions              Neurological - no tremors              Psychiatric - no anxiety, alert & oriented                 The rest of a comprehensive physical  "examination is deferred due to [facility] (public health emergency) video visit restrictions.    A/P:  Gender dysphoria     The criteria for genital surgery are specific to the type of surgery being requested.  Criteria for bottom surgery:     1. Persistent, well documented gender dysphoria;  2. Capacity to make a fully informed decision and to consent for treatment;  3. Age of consent (>18 years old)  4. If significant medical or mental health concerns are present, they must be well controlled.  5. 12 continuous months of hormone therapy as appropriate to the patient s gender goals (unless  the patient has a medical contraindication or is otherwise unable or unwilling to take  Hormones).  6. Two letters of support     The aim of hormone therapy prior to gonadectomy is primarily to introduce a period of reversible  estrogen or testosterone suppression, before the patient undergoes irreversible surgical intervention.     I reviewed the steps of orchiectomy. I reviewed the surgical procedure. I reviewed the risks and benefits including bleeding, infection and irreversible nature of the procedure. The patient would like to proceed with minimal depth vaginoplasty     She has a persistent, well documented gender dysphoria. She has capacity to make a fully informed decision and to consent for treatment. Her mental health issues are well controlled. She has been on continuous hormones for years.      The patient meets all of these criteria. We discussed that gender affirmation surgery should be considered permanent.      Prefers minimal labia minora. \"fatter labia majora\".  She does not do well with opiates.    Patient prefers minimal depth vaginpplasty    Will get letters uploaded. Then submit RACHEL West MD  "

## 2023-12-13 NOTE — NURSING NOTE
Is the patient currently in the state of [place]? YES    Visit mode:VIDEO    If the visit is dropped, the patient can be reconnected by: VIDEO VISIT: Text to cell phone:   Telephone Information:   Mobile [phone]       Will anyone else be joining the visit? NO  (If patient encounters technical issues they should call [phone]    How would you like to obtain your AVS? MyChart    Are changes needed to the allergy or medication list? No    Reason for visit: RECHECK (Return, discuss vaginoplasty )    Lorene MYLES

## 2023-12-13 NOTE — PROGRESS NOTES
Virtual Visit Details    Type of service:  Video Visit     Originating Location (pt. Location): Home    Distant Location (provider location):  Off-site  Platform used for Video Visit: Mal   200p-215y      Emmy is a 22 year old transgender female seen in consultation for gender dysphoria     Patient states she has always been who she is and does not identify as transgender. She said she deeply repressed her true identity until late teens when she started accepting she is a woman. She has transitioned gradually as feminine qualities more embraced. HRT in 2021.  Preferred pronouns are: she/her/hers  The patient has been on exogenous hormones since: May 14, 2021.  In terms of an intimate relationship, the patient lives with her boyfriend who is very supportive  In terms of fertility, the patient: has no desire for biologic children or sperm banking    Finalizing letters of support    The patient has previously undergone no gender surgeries.     Long-term surgical goals for the patient include: minimal depth vaginoplasty     Did consider orchiectomy at first. But just wants minimal depth.     The patient has not begun hair removal.        Exam:           Constitutional - No apparent distress. Patient of stated age.               Eyes - no redness or discharge.              Respiratory - no cough. no labored breathing              Musculoskeletal - full range of motion in all extremities              Skin - no visible skin discoloration or lesions              Neurological - no tremors              Psychiatric - no anxiety, alert & oriented                 The rest of a comprehensive physical examination is deferred due to [facility] (public health emergency) video visit restrictions.    A/P:  Gender dysphoria     The criteria for genital surgery are specific to the type of surgery being requested.  Criteria for bottom surgery:     1. Persistent, well documented gender dysphoria;  2. Capacity to make a fully informed decision  "and to consent for treatment;  3. Age of consent (>18 years old)  4. If significant medical or mental health concerns are present, they must be well controlled.  5. 12 continuous months of hormone therapy as appropriate to the patient s gender goals (unless  the patient has a medical contraindication or is otherwise unable or unwilling to take  Hormones).  6. Two letters of support     The aim of hormone therapy prior to gonadectomy is primarily to introduce a period of reversible  estrogen or testosterone suppression, before the patient undergoes irreversible surgical intervention.     I reviewed the steps of orchiectomy. I reviewed the surgical procedure. I reviewed the risks and benefits including bleeding, infection and irreversible nature of the procedure. The patient would like to proceed with minimal depth vaginoplasty     She has a persistent, well documented gender dysphoria. She has capacity to make a fully informed decision and to consent for treatment. Her mental health issues are well controlled. She has been on continuous hormones for years.      The patient meets all of these criteria. We discussed that gender affirmation surgery should be considered permanent.      Prefers minimal labia minora. \"fatter labia majora\".  She does not do well with opiates.    Patient prefers minimal depth vaginpplasty    Will get letters uploaded. Then submit RACHEL West MD  "

## 2024-01-09 ENCOUNTER — DOCUMENTATION ONLY (OUTPATIENT)
Dept: PLASTIC SURGERY | Facility: CLINIC | Age: 24
End: 2024-01-09
Payer: COMMERCIAL

## 2024-01-09 NOTE — PROGRESS NOTES
[facility] :  Care Coordination Note     SITUATION   Emmy Olivera (she/her) is a 23 year old adult who is receiving support for:  Care Team  .    BACKGROUND     Two LOS received and meet criteria. Sent message to RNCC to request PA for minimal depth vaginoplasty from surgeon.    ASSESSMENT     Surgery              CGC Assessment  Comprehensive Gender Care (CGC) Enrollment: Enrolled  Patient has a therapist: Yes  Name of therapist: Brett Stewart  Letter of support #1: Received  Letter #1 Date: 11/27/23  Letter of support #2: Received  Letter #2 Date: 12/15/23  Surgery being considered: Yes  Vaginoplasty: Yes      PLAN          Nursing Interventions:       Follow-up plan:  Surgeon will PA for minimal depth vaginoplasty.       ROYCE Rivers

## 2024-12-08 ENCOUNTER — HEALTH MAINTENANCE LETTER (OUTPATIENT)
Age: 24
End: 2024-12-08

## 2025-01-15 ENCOUNTER — HOSPITAL ENCOUNTER (EMERGENCY)
Facility: CLINIC | Age: 25
Discharge: HOME | End: 2025-01-16
Attending: EMERGENCY MEDICINE
Payer: COMMERCIAL

## 2025-01-15 ENCOUNTER — APPOINTMENT (OUTPATIENT)
Dept: GENERAL RADIOLOGY | Facility: CLINIC | Age: 25
End: 2025-01-15
Attending: EMERGENCY MEDICINE
Payer: COMMERCIAL

## 2025-01-15 VITALS
SYSTOLIC BLOOD PRESSURE: 134 MMHG | OXYGEN SATURATION: 98 % | HEART RATE: 89 BPM | RESPIRATION RATE: 16 BRPM | TEMPERATURE: 97.5 F | DIASTOLIC BLOOD PRESSURE: 94 MMHG

## 2025-01-15 DIAGNOSIS — S62.339A CLOSED BOXER'S FRACTURE, INITIAL ENCOUNTER: ICD-10-CM

## 2025-01-15 PROCEDURE — 99283 EMERGENCY DEPT VISIT LOW MDM: CPT | Mod: 25 | Performed by: EMERGENCY MEDICINE

## 2025-01-15 PROCEDURE — 73130 X-RAY EXAM OF HAND: CPT | Mod: RT

## 2025-01-15 PROCEDURE — 29125 APPL SHORT ARM SPLINT STATIC: CPT | Mod: RT | Performed by: EMERGENCY MEDICINE

## 2025-01-15 PROCEDURE — 99284 EMERGENCY DEPT VISIT MOD MDM: CPT | Mod: 25 | Performed by: EMERGENCY MEDICINE

## 2025-01-15 PROCEDURE — 73130 X-RAY EXAM OF HAND: CPT | Mod: 26 | Performed by: RADIOLOGY

## 2025-01-15 ASSESSMENT — ACTIVITIES OF DAILY LIVING (ADL): ADLS_ACUITY_SCORE: 41

## 2025-01-15 NOTE — LETTER
January 16, 2025      To Whom It May Concern:      Riki Olivera was seen in our Emergency Department today, 01/16/25.  She may return to work to work but will need light duty and no lifting anything more than 5 pounds for the next week until she can follow up in clinic to determine long term plan.    Sincerely,        Rebecca Tellez MD  Electronically signed
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
## 2025-01-16 NOTE — DISCHARGE INSTRUCTIONS
The [facility]  will call you to coordinate your care as prescribed by your provider. A representative will call you within 2 business days to help you schedule your appointment, or you may contact the  Representative at: [phone].  Follow-up in approximately 1 week for repeat imaging and further management.  Continue to keep splint in place until your follow-up.  If you have any worsening symptoms including numbness, tingling, discoloration of your fingers or other concerns return to the emergency department for reevaluation.
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
## 2025-01-28 DIAGNOSIS — S62.339A: ICD-10-CM

## 2025-01-28 DIAGNOSIS — M79.641 RIGHT HAND PAIN: Primary | ICD-10-CM
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
## 2025-01-29 ENCOUNTER — OFFICE VISIT (OUTPATIENT)
Dept: ORTHOPEDICS | Facility: CLINIC | Age: 25
End: 2025-01-29
Payer: COMMERCIAL

## 2025-01-29 ENCOUNTER — ANCILLARY PROCEDURE (OUTPATIENT)
Dept: GENERAL RADIOLOGY | Facility: CLINIC | Age: 25
End: 2025-01-29
Attending: FAMILY MEDICINE
Payer: COMMERCIAL

## 2025-01-29 ENCOUNTER — PRE VISIT (OUTPATIENT)
Dept: ORTHOPEDICS | Facility: CLINIC | Age: 25
End: 2025-01-29

## 2025-01-29 DIAGNOSIS — S62.339A: ICD-10-CM

## 2025-01-29 DIAGNOSIS — S62.339A CLOSED BOXER'S FRACTURE, INITIAL ENCOUNTER: ICD-10-CM

## 2025-01-29 DIAGNOSIS — M79.641 RIGHT HAND PAIN: ICD-10-CM

## 2025-01-29 PROCEDURE — 73130 X-RAY EXAM OF HAND: CPT | Mod: RT | Performed by: RADIOLOGY

## 2025-01-29 NOTE — PROGRESS NOTES
Cast/splint application    Date/Time: 1/29/2025 2:21 PM    Performed by: Alen Mendoza ATC  Authorized by: Naila Ventura MD    Consent:     Consent obtained:  Verbal    Consent given by:  Patient    Risks discussed:  Discoloration, numbness, pain and swelling  Pre-procedure details:     Sensation:  Normal  Procedure details:     Laterality:  Right    Location:  Hand    Hand:  R hand    Cast type:  Ulnar gutter    Supplies:  Fiberglass  Post-procedure details:     Pain:  Unchanged    Pain level:  0/10    Sensation:  Normal    Patient tolerance of procedure:  Tolerated well, no immediate complications    Patient provided with cast or splint care instructions: Yes    Comments:        Alen Mendoza M.A., MARIANA, ATC  Certified Athletic Trainer        Relevant Diagnosis: 5th MC Fx, right    Ulnar Gutter application and care    Type of Cast applied: Ulnar Gutter   Cast/Splinting material used: Fiberglass    Person(s) involved in teaching:   Patient     Motivation Level:  Asks Questions: Yes  Eager to Learn: Yes  Cooperative: Yes  Receptive (willing/able to accept information): Yes     Patient demonstrates understanding of the following:   Reason for the appointment, diagnosis and treatment plan: Yes    Which situations necessitate calling provider and whom to contact: Yes     Teaching Concerns Addressed:   Comments: None     Proper use and care of Ulnar Gutter cast: Yes  Pain management techniques: Yes  Wound Care: Yes  How and/when to access community resources: Yes     Cast was applied in standard M[place]:  Yes  Cast fit well:  Yes  Patient reports cast to fit comfortably:  Yes    Instructional Materials Used/Given: Verbal       Alen Mendoza M.A., LAT ATC  Certified Athletic Trainer

## 2025-01-29 NOTE — PROGRESS NOTES
ASSESSMENT & PLAN    Emmy was seen today for pain.    Diagnoses and all orders for this visit:    Closed boxer's fracture, initial encounter  -     Orthopedic  Referral    24 year old female presenting approximately 2 weeks after sustaining boxers fracture. Initially placed in splint in ER, follow up films today with good alignment and minimal callous. Offered prescription anti-inflammatory medication, patient denies this today. Will place in ulnar gutter cast and plan for follow up in 4 weeks with repeat pre-appointment x ray.      The patient was seen by and discussed with attending physician Dr.Suzanne SADE Ventura MD, CAQ, CCD, who agrees with the plan unless otherwise stated.     Walter Cruz DO  Primary Care Spots Medicine Fellow   [facility]   01/29/2025    Naila Ventura MD  [facility] [place]    -----  Chief Complaint   Patient presents with    Right Hand - Pain       SUBJECTIVE  Riki Olivera is a/an 24 year old adult who is seen as an ER referral for evaluation of the right hand.     The patient is seen by themselves.  The patient is Right handed    Onset: 1/15/25. Patient describes injury as punching an appliance.  Location of Pain: right hand, well controlled   Worsened by: work and moving the hand around   Better with: rest  Treatments tried: N/A  Associated symptoms: swelling, brusing  Pain is well controlled today, was wearing ulnar gutter plaster cast from ER. Has not used NSAID or controlled prescriptions for pain control. Does smoke marijuana occasionally.     Orthopedic/Surgical history: NO  Social History/Occupation: Fast food      REVIEW OF SYSTEMS:  Review of Systems    OBJECTIVE:  There were no vitals taken for this visit.   General: healthy, alert and in no distress  Skin: no suspicious lesions or rash.  CV: distal perfusion intact   Resp: normal respiratory effort without conversational dyspnea   Psych: normal mood  and affect  RIGHT HAND  Inspection:    Mild medial hand ecchymosis and edema across the 4th and 5th distal metacarpal  Palpation:   Carpals: normal   Metacarpals: 5th metacarpal with obvious step off at the distal aspect consistent with displaced boxers fracture   Fingers: normal, no extensor lag of the 5th digit   Range of Motion:    Finger cascade with minimal radial deviation of the 5th digit compared contralaterally  Strength:     show full range, extension show full range    RADIOLOGY:  Final results and radiologist's interpretation, were available in the [facility] health record following signing of this chart.   Images were reviewed with the patient in the office today.  My personal interpretation of the performed imaging: re demonstration of fracture of the distal right fifth metacarpal with volar angulation of the fracture fragment without change in alignment.

## 2025-01-29 NOTE — LETTER
1/29/2025      RE: Riki Olivera  [address] [facility] [id number]     Dear Colleague,    Thank you for referring your patient, Riki Olivera, to the [facility]. Please see a copy of my visit note below.    ASSESSMENT & PLAN    Emmy was seen today for pain.    Diagnoses and all orders for this visit:    Closed boxer's fracture, initial encounter  -     Orthopedic  Referral    24 year old female presenting approximately 2 weeks after sustaining boxers fracture. Initially placed in splint in ER, follow up films today with good alignment and minimal callous. Offered prescription anti-inflammatory medication, patient denies this today. Will place in ulnar gutter cast and plan for follow up in 4 weeks with repeat pre-appointment x ray.      The patient was seen by and discussed with attending physician Dr.Suzanne SADE Ventura MD, CAQ, CCD, who agrees with the plan unless otherwise stated.     Walter Cruz DO  Primary Care Spots Medicine Fellow   [facility]   01/29/2025    Naila Ventura MD  [facility]    -----  Chief Complaint   Patient presents with     Right Hand - Pain       SUBJECTIVE  Riki Olivera is a/an 24 year old adult who is seen as an ER referral for evaluation of the right hand.     The patient is seen by themselves.  The patient is Right handed    Onset: 1/15/25. Patient describes injury as punching an appliance.  Location of Pain: right hand, well controlled   Worsened by: work and moving the hand around   Better with: rest  Treatments tried: N/A  Associated symptoms: swelling, brusing  Pain is well controlled today, was wearing ulnar gutter plaster cast from ER. Has not used NSAID or controlled prescriptions for pain control. Does smoke marijuana occasionally.     Orthopedic/Surgical history: NO  Social History/Occupation: Fast food      REVIEW OF SYSTEMS:  Review of  Systems    OBJECTIVE:  There were no vitals taken for this visit.   General: healthy, alert and in no distress  Skin: no suspicious lesions or rash.  CV: distal perfusion intact   Resp: normal respiratory effort without conversational dyspnea   Psych: normal mood and affect  RIGHT HAND  Inspection:    Mild medial hand ecchymosis and edema across the 4th and 5th distal metacarpal  Palpation:   Carpals: normal   Metacarpals: 5th metacarpal with obvious step off at the distal aspect consistent with displaced boxers fracture   Fingers: normal, no extensor lag of the 5th digit   Range of Motion:    Finger cascade with minimal radial deviation of the 5th digit compared contralaterally  Strength:     show full range, extension show full range    RADIOLOGY:  Final results and radiologist's interpretation, were available in the [facility] health record following signing of this chart.   Images were reviewed with the patient in the office today.  My personal interpretation of the performed imaging: re demonstration of fracture of the distal right fifth metacarpal with volar angulation of the fracture fragment without change in alignment.          Cast/splint application    Date/Time: 1/29/2025 2:21 PM    Performed by: Alen Mendoza ATC  Authorized by: Naila Ventura MD    Consent:     Consent obtained:  Verbal    Consent given by:  Patient    Risks discussed:  Discoloration, numbness, pain and swelling  Pre-procedure details:     Sensation:  Normal  Procedure details:     Laterality:  Right    Location:  Hand    Hand:  R hand    Cast type:  Ulnar gutter    Supplies:  Fiberglass  Post-procedure details:     Pain:  Unchanged    Pain level:  0/10    Sensation:  Normal    Patient tolerance of procedure:  Tolerated well, no immediate complications    Patient provided with cast or splint care instructions: Yes    Comments:        Alen Mendoza M.A., LAT, ATC  Certified Athletic Trainer        Relevant Diagnosis: 5th  MC Fx, right    Ulnar Gutter application and care    Type of Cast applied: Ulnar Gutter   Cast/Splinting material used: Fiberglass    Person(s) involved in teaching:   Patient     Motivation Level:  Asks Questions: Yes  Eager to Learn: Yes  Cooperative: Yes  Receptive (willing/able to accept information): Yes     Patient demonstrates understanding of the following:   Reason for the appointment, diagnosis and treatment plan: Yes    Which situations necessitate calling provider and whom to contact: Yes     Teaching Concerns Addressed:   Comments: None     Proper use and care of Ulnar Gutter cast: Yes  Pain management techniques: Yes  Wound Care: Yes  How and/when to access community resources: Yes     Cast was applied in standard Manner:  Yes  Cast fit well:  Yes  Patient reports cast to fit comfortably:  Yes    Instructional Materials Used/Given: Verbal       Alen Mendoza M.A., LAT, ATC  Certified Athletic Trainer           Attending Note:   I have   examined this patient and have reviewed the clinical presentation and progress note with the fellow. I agree with the treatment plan as outlined. The plan was formulated with the fellow on the day of the patient's visit. I have reviewed all imaging with the fellow and agree with the findings in the documentation.     Naila Ventura MD, CAQ, CCD  [facility]  Sports Medicine and Bone Health      Again, thank you for allowing me to participate in the care of your patient.      Sincerely,    Naila Ventura MD

## 2025-01-30 NOTE — PROGRESS NOTES
Attending Note:   I have   examined this patient and have reviewed the clinical presentation and progress note with the fellow. I agree with the treatment plan as outlined. The plan was formulated with the fellow on the day of the patient's visit. I have reviewed all imaging with the fellow and agree with the findings in the documentation.     Naila Ventura MD, CAQ, CCD  [facility]  Sports Medicine and Bone Health

## 2025-02-13 DIAGNOSIS — S62.339A CLOSED BOXER'S FRACTURE, INITIAL ENCOUNTER: Primary | ICD-10-CM
